# Patient Record
Sex: MALE | Race: AMERICAN INDIAN OR ALASKA NATIVE
[De-identification: names, ages, dates, MRNs, and addresses within clinical notes are randomized per-mention and may not be internally consistent; named-entity substitution may affect disease eponyms.]

---

## 2020-09-16 ENCOUNTER — HOSPITAL ENCOUNTER (OUTPATIENT)
Dept: HOSPITAL 5 - XRAY | Age: 60
Discharge: HOME | End: 2020-09-16
Attending: INTERNAL MEDICINE
Payer: COMMERCIAL

## 2020-09-16 DIAGNOSIS — M47.817: Primary | ICD-10-CM

## 2020-09-16 DIAGNOSIS — M17.12: ICD-10-CM

## 2020-09-16 DIAGNOSIS — M19.031: ICD-10-CM

## 2020-09-16 DIAGNOSIS — M17.11: ICD-10-CM

## 2020-09-16 DIAGNOSIS — M51.35: ICD-10-CM

## 2020-09-16 DIAGNOSIS — F43.12: ICD-10-CM

## 2020-09-16 DIAGNOSIS — M75.102: ICD-10-CM

## 2020-09-16 DIAGNOSIS — M51.36: ICD-10-CM

## 2020-09-16 DIAGNOSIS — M75.101: ICD-10-CM

## 2020-09-16 DIAGNOSIS — M72.2: ICD-10-CM

## 2020-09-16 PROCEDURE — 72100 X-RAY EXAM L-S SPINE 2/3 VWS: CPT

## 2020-09-16 NOTE — XRAY REPORT
LUMBAR SPINE 3 VIEWS



INDICATION / CLINICAL INFORMATION:

BACK PAIN.



COMPARISON: 

None available.



FINDINGS:

VERTEBRAE: No fracture. No significant malalignment.

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

FACET JOINTS:Moderate facet degenerative disease L4-S1.



ADDITIONAL FINDINGS: None.



IMPRESSION:

1.  No significant abnormality.



Signer Name: Alvarado Richard MD 

Signed: 9/16/2020 10:53 AM

Workstation Name: [id number]